# Patient Record
Sex: FEMALE | URBAN - METROPOLITAN AREA
[De-identification: names, ages, dates, MRNs, and addresses within clinical notes are randomized per-mention and may not be internally consistent; named-entity substitution may affect disease eponyms.]

---

## 2023-03-13 ENCOUNTER — OFFICE VISIT (OUTPATIENT)
Age: 63
End: 2023-03-13
Payer: COMMERCIAL

## 2023-03-13 VITALS — WEIGHT: 190 LBS | BODY MASS INDEX: 37.3 KG/M2 | TEMPERATURE: 97.5 F | HEIGHT: 60 IN

## 2023-03-13 DIAGNOSIS — G89.29 CHRONIC PAIN OF RIGHT KNEE: ICD-10-CM

## 2023-03-13 DIAGNOSIS — M17.11 PRIMARY OSTEOARTHRITIS OF RIGHT KNEE: Primary | ICD-10-CM

## 2023-03-13 DIAGNOSIS — M25.561 CHRONIC PAIN OF RIGHT KNEE: ICD-10-CM

## 2023-03-13 PROCEDURE — 20611 DRAIN/INJ JOINT/BURSA W/US: CPT | Performed by: ORTHOPAEDIC SURGERY

## 2023-03-13 PROCEDURE — 99203 OFFICE O/P NEW LOW 30 MIN: CPT | Performed by: ORTHOPAEDIC SURGERY

## 2023-03-13 PROCEDURE — 73560 X-RAY EXAM OF KNEE 1 OR 2: CPT | Performed by: ORTHOPAEDIC SURGERY

## 2023-03-13 RX ORDER — TRIAMCINOLONE ACETONIDE 40 MG/ML
40 INJECTION, SUSPENSION INTRA-ARTICULAR; INTRAMUSCULAR ONCE
Status: COMPLETED | OUTPATIENT
Start: 2023-03-13 | End: 2023-03-13

## 2023-03-13 RX ADMIN — TRIAMCINOLONE ACETONIDE 40 MG: 40 INJECTION, SUSPENSION INTRA-ARTICULAR; INTRAMUSCULAR at 16:25

## 2023-03-13 NOTE — PROGRESS NOTES
Elaine Plascencia  1960   Chief Complaint   Patient presents with    Knee Pain     Rt         HISTORY OF PRESENT ILLNESS  Elaine Plascencia is a 57 y.o. female who presents today for evaluation of right knee pain. Pain is a 10/10. Pain has been present for years. No specific injury. Pain has been worsening over time. Has pain with walking. Has tried following treatments: Injections:No; Brace:No; Therapy:No; Cane/Crutch:No      No Known Allergies     History reviewed. No pertinent past medical history. Social History    None        History reviewed. No pertinent surgical history. History reviewed. No pertinent family history. No current outpatient medications on file. Current Facility-Administered Medications   Medication Dose Route Frequency    triamcinolone acetonide (KENALOG-40) injection 40 mg  40 mg Intra-artICUlar Once       REVIEW OF SYSTEM   Patient denies: Weight loss, Fever/Chills, HA, Visual changes, Fatigue, Chest pain, SOB, Abdominal pain, N/V/D/C, Blood in stool or urine, Edema. Pertinent positive as above in HPI. All others were negative    PHYSICAL EXAM:   Temp 97.5 °F (36.4 °C) (Temporal)   Ht 5' (1.524 m)   Wt 190 lb (86.2 kg)   BMI 37.11 kg/m²   The patient is a well-developed, well-nourished female   in no acute distress. The patient is alert and oriented times three. The patient is alert and oriented times three. Mood and affect are normal.  LYMPHATIC: lymph nodes are not enlarged and are within normal limits  SKIN: normal in color and non tender to palpation. There are no bruises or abrasions noted. NEUROLOGICAL: Motor sensory exam is within normal limits. Reflexes are equal bilaterally.  There is normal sensation to pinprick and light touch  MUSCULOSKELETAL:  Examination Right knee   Skin Intact   Range of motion    Effusion +   Medial joint line tenderness +   Lateral joint line tenderness +   Tenderness Pes Bursa -   Tenderness insertion MCL -   Tenderness insertion LCL -   Farazs -   Patella crepitus +   Patella grind +   Lachman -   Pivot shift -   Anterior drawer -   Posterior drawer -   Varus stress -   Valgus stress -   Neurovascular Intact   Calf Swelling and Tenderness to Palpation -   Kevin's Test -   Hamstring Cord Tightness -        PROCEDURE: right knee Injection with Ultrasound Guidance    Indication:  right knee pain/swelling    After sterile prep, 4mL lidocaine with 1mL 40mg Kenalog were injected into the right  knee intraarticular under ultrasound guidance. Ultrasound images captured and scanned into patient's chart. [facility] - [facility]  OFFICE PROCEDURE PROGRESS NOTE        Chart reviewed for the following:  Padma Noonan MD, have reviewed the History, Physical and updated the Allergic reactions for [address] [place] [address] performed immediately prior to start of procedure:  I, Priyank Michelle MD have performed the following reviews on [facility] prior to the start of the procedure:            * Patient was identified by name and date of birth   * Agreement on procedure being performed was verified  * Risks and Benefits explained to the patient  * Procedure site verified and marked as necessary  * Patient was positioned for comfort  * Consent was signed and verified     Time: 4:13 PM    Date of procedure: 3/13/2023    Procedure performed by:  Priyank Michelle MD    Provider assisted by: (see medication administration)    How tolerated by patient: tolerated the procedure well with no complications    Comments: none      IMAGING: XR of the right knee with 2 views obtained in the office dated 3/13/2023 reviewed and read by Dr. Michi Arias: Marked degenerative changes in the medial compartment with large subchondral cyst on the tibial plateau. IMPRESSION:      ICD-10-CM    1.  Primary osteoarthritis of right knee  M17.11 AMB POC US DRAIN/INJECT LARGE JOINT/BURSA     triamcinolone acetonide (KENALOG-40) injection 40 mg     [facility] - Catie Austin DO, Orthopedic Surgery(General/Total Joint), [place] ([address] [place] [address])      2. Chronic pain of right knee  M25.561 [05877] Knee 1-2V    G89.29            PLAN:   1. Pt presents today with right knee pain due to primary OA and I am hopeful a cortisone injection will provide relief. Will also refer to Dr. Danish Campo for surgical consult for TKA. Patient was provided with physician-directed home exercise program in the office today. Risk factors include: BMI>35  2. Yes cortisone injection indicated today R KNEE US  3. No Physical/Occupational Therapy indicated today  4. No diagnostic test indicated today   5. No durable medical equipment indicated today  6. Yes referral indicated today DR AUSTIN  7. No medications indicated today:   8. No Narcotic indicated today     RTC prn     Scribed by Francesca Vasquez) as dictated by Jaiden Arthur MD    I, Dr. Jaiden Arthur, confirm that all documentation is accurate.     Jaiden Arthur M.D.   [facility]

## 2023-04-04 ENCOUNTER — OFFICE VISIT (OUTPATIENT)
Age: 63
End: 2023-04-04
Payer: COMMERCIAL

## 2023-04-04 DIAGNOSIS — E66.9 CLASS 2 OBESITY WITH BODY MASS INDEX (BMI) OF 37.0 TO 37.9 IN ADULT, UNSPECIFIED OBESITY TYPE, UNSPECIFIED WHETHER SERIOUS COMORBIDITY PRESENT: ICD-10-CM

## 2023-04-04 DIAGNOSIS — M17.11 PRIMARY OSTEOARTHRITIS OF RIGHT KNEE: Primary | ICD-10-CM

## 2023-04-04 PROCEDURE — 99214 OFFICE O/P EST MOD 30 MIN: CPT | Performed by: ORTHOPAEDIC SURGERY

## 2023-04-04 PROCEDURE — 73560 X-RAY EXAM OF KNEE 1 OR 2: CPT | Performed by: ORTHOPAEDIC SURGERY

## 2023-04-04 NOTE — ASSESSMENT & PLAN NOTE
43-year-old female with severe osteoarthritis of the right knee. She was referred by Dr. Earnestine Goodwin after he gave her a corticosteroid injection on March 13, 2023 which did give her good relief however it only lasted for about 10 days. She has had this pain for several years and has not gotten adequate relief from Tylenol or ibuprofen or other anti-inflammatory therapy. She has bone-on-bone arthritis with significant cyst formation of the tibia on x-rays. At this point she has failed conservative treatment and I will get her scheduled for a right total knee arthroplasty. The earliest date she can go forward with this is June 13 due to her corticosteroid injection on March 13. The patient has a large cyst in her tibia and will require preoperative CT scan to assess the extent of bone loss.

## 2023-04-04 NOTE — PROGRESS NOTES
Patient: Carol Matamoros                MRN: [id number]       SSN: [id number]  YOB: 1960        AGE: 57 y.o. SEX: female    PCP: No primary care provider on file. 04/04/23    Chief Complaint: Knee Pain (Right knee)      HPI:  Carol Matamoros is a 57 y.o. female with chief complaint of   Chief Complaint   Patient presents with    Knee Pain     Right knee       1. Primary osteoarthritis of right knee  Assessment & Plan:  67-year-old female with severe osteoarthritis of the right knee. She was referred by Dr. Dino Larson after he gave her a corticosteroid injection on March 13, 2023 which did give her good relief however it only lasted for about 10 days. She has had this pain for several years and has not gotten adequate relief from Tylenol or ibuprofen or other anti-inflammatory therapy. She has bone-on-bone arthritis with significant cyst formation of the tibia on x-rays. At this point she has failed conservative treatment and I will get her scheduled for a right total knee arthroplasty. The earliest date she can go forward with this is June 13 due to her corticosteroid injection on March 13. The patient has a large cyst in her tibia and will require preoperative CT scan to assess the extent of bone loss. Orders:  -     AMB POC XRAY, KNEE; 1/2 VIEWS  -     SCHEDULE SURGERY  2. Class 2 obesity with body mass index (BMI) of 37.0 to 37.9 in adult, unspecified obesity type, unspecified whether serious comorbidity present        AMB PAIN ASSESSMENT 4/4/2023   Location of Pain Knee   Location Modifiers Right   Severity of Pain 10   Quality of Pain Other (Comment)     Tobacco Use: Low Risk     Smoking Tobacco Use: Never    Smokeless Tobacco Use: Never    Passive Exposure: Never           PHYSICAL EXAMINATION:  There were no vitals taken for this visit. There is no height or weight on file to calculate BMI.   Wt Readings from Last 3 Encounters:   03/13/23 190 lb (86.2 kg)       GENERAL: Alert and

## 2023-04-27 PROBLEM — E66.9 CLASS 2 OBESITY WITH BODY MASS INDEX (BMI) OF 37.0 TO 37.9 IN ADULT: Status: RESOLVED | Noted: 2023-04-04 | Resolved: 2023-04-27

## 2023-05-02 DIAGNOSIS — M17.11 PRIMARY OSTEOARTHRITIS OF RIGHT KNEE: Primary | ICD-10-CM

## 2023-05-02 DIAGNOSIS — Z01.811 PRE-OP CHEST EXAM: ICD-10-CM

## 2023-05-02 DIAGNOSIS — Z01.810 PREOP CARDIOVASCULAR EXAM: ICD-10-CM

## 2023-05-02 DIAGNOSIS — Z01.818 PREOPERATIVE TESTING: ICD-10-CM
